# Patient Record
Sex: MALE | Race: WHITE | Employment: OTHER | URBAN - METROPOLITAN AREA
[De-identification: names, ages, dates, MRNs, and addresses within clinical notes are randomized per-mention and may not be internally consistent; named-entity substitution may affect disease eponyms.]

---

## 2019-08-14 ENCOUNTER — TRANSFERRED RECORDS (OUTPATIENT)
Dept: HEALTH INFORMATION MANAGEMENT | Facility: CLINIC | Age: 74
End: 2019-08-14

## 2019-10-10 ENCOUNTER — TRANSFERRED RECORDS (OUTPATIENT)
Dept: HEALTH INFORMATION MANAGEMENT | Facility: CLINIC | Age: 74
End: 2019-10-10

## 2019-11-07 ENCOUNTER — TRANSFERRED RECORDS (OUTPATIENT)
Dept: HEALTH INFORMATION MANAGEMENT | Facility: CLINIC | Age: 74
End: 2019-11-07

## 2019-11-12 ENCOUNTER — TRANSFERRED RECORDS (OUTPATIENT)
Dept: HEALTH INFORMATION MANAGEMENT | Facility: CLINIC | Age: 74
End: 2019-11-12

## 2019-11-15 ENCOUNTER — TRANSFERRED RECORDS (OUTPATIENT)
Dept: HEALTH INFORMATION MANAGEMENT | Facility: CLINIC | Age: 74
End: 2019-11-15

## 2019-11-15 ENCOUNTER — MEDICAL CORRESPONDENCE (OUTPATIENT)
Dept: HEALTH INFORMATION MANAGEMENT | Facility: CLINIC | Age: 74
End: 2019-11-15

## 2019-12-16 ENCOUNTER — TRANSFERRED RECORDS (OUTPATIENT)
Dept: HEALTH INFORMATION MANAGEMENT | Facility: CLINIC | Age: 74
End: 2019-12-16

## 2019-12-18 ENCOUNTER — TRANSCRIBE ORDERS (OUTPATIENT)
Dept: OTHER | Age: 74
End: 2019-12-18

## 2019-12-18 DIAGNOSIS — K22.719 BARRETT'S ESOPHAGUS WITH DYSPLASIA: Primary | ICD-10-CM

## 2019-12-19 ENCOUNTER — CARE COORDINATION (OUTPATIENT)
Dept: GASTROENTEROLOGY | Facility: CLINIC | Age: 74
End: 2019-12-19

## 2019-12-19 DIAGNOSIS — C15.9 ESOPHAGEAL CANCER (H): Primary | ICD-10-CM

## 2019-12-19 NOTE — PROGRESS NOTES
Care Coordination New Patient Referral  [facility]    NP referral date: 12/19/19  Referred to MD: [facility]    Referring MD: Leeroy Peña   Referring contact information see initial referral note - referral received 12/17/19 via fax to [facility] for procedure, no MD specific requested.      Referral for EUS for staging    Diagnosis: esophageal cancer - invasive Barretts    Imaging:   CT   [facility]  Date:  10/10/19  MRI    [facility]  Date:  11/20/19  PET 12/16/19    Procedures:  EGD    Referral  Briefly reviewed by Dr. Araujo. Proceed with EUS for staging.  [facility] is aware that [facility] will be tentatively scheduled for 1/7/20 and time is being held in endoscopy for EUS.  Images have been requested.    Referral sent to endoscopy scheduling on 01/02/20 at 9:15 am via in [facility] staff message and the patient will be contacted with appointment.       Rajwinder KAYN, HNBC  RN Care Coordinator  [facility]  Ph: [phone]  Email: [email or web address]

## 2019-12-26 ENCOUNTER — TRANSFERRED RECORDS (OUTPATIENT)
Dept: HEALTH INFORMATION MANAGEMENT | Facility: CLINIC | Age: 74
End: 2019-12-26

## 2019-12-27 ENCOUNTER — TELEPHONE (OUTPATIENT)
Dept: GASTROENTEROLOGY | Facility: CLINIC | Age: 74
End: 2019-12-27

## 2019-12-27 NOTE — TELEPHONE ENCOUNTER
Care Coordination Telephone Call  [facility]     [facility]  Informed that Dr. Araujo will review and we are holding time for procedure 1/7/20.    Rajwinder KAYN, HNBC, STAR-T  RN Care Coordinator  [facility]  Ph: [phone]  FAX: [phone]

## 2019-12-27 NOTE — TELEPHONE ENCOUNTER
[facility] Call Center    Phone Message    May a detailed message be left on voicemail: yes    Reason for Call: Other: Please call Joe from the [facility] at [phone] re appt for Rian.     Action Taken: Other: Soha Mora

## 2020-01-02 ENCOUNTER — TELEPHONE (OUTPATIENT)
Dept: GASTROENTEROLOGY | Facility: CLINIC | Age: 75
End: 2020-01-02

## 2020-01-02 NOTE — TELEPHONE ENCOUNTER
Patient Name: Rian Lozada   : 1945  MRN: [id number]       : [x] N/A       [x] Gary Inactive  _____________________________________________________      Patient scheduled for:    [x] EUS      Indication for procedure.  [x] Esophageal CA; Shepherd's     Sedation Type:   [x] MAC       Procedure Provider:  Van      Referring Provider. Leeroy Peña ([facility])    Arrival time verified: .830    Facility location verified:   [facility] - [address] [address]  1st Floor, Rm [phone]    [x] N/A for this Payor ([place])    Pt meets medical necessity for outpatient procedure in hospital Endoscopy Unit: N/A      Additional Information: See below  __________________________________________________      Patient taking any blood thinners ? Yes    Anticoagulants or blood thinners:           [x] ASA 81mg  - may continue   ................................................            Heart disease ? Yes: HTN      Lung disease ? No        Sleep apnea ? No      Diabetic ? No      Kidney disease ? Ukn: Monitoring a spot on Rt kidney  Hemodialysis: N/A      Electronic implanted medical devices ? No      History & Physical: [x] Complete, Date [facility] Week of 19 - will have H&P faxed to Endoscopy for scanning      Prep Type:   [x]NPO /p MN, No solid food /p 2200 the night before      Instructions given: [x] Verbal   [x] Reviewed         Pre procedure teaching completed: [x] Yes - Reviewed      IV Sedation: [x] No questions regarding Sedation as ordered       :   o Transportation from procedure & responsible adult to be with patient following procedure for a minimum of 24 hrs (MAC): [x] Wife - confirmed will have post-procedure companionship as required  _______________________________________________    Jennifer Ferrell RN  [facility]

## 2020-01-07 ENCOUNTER — HOSPITAL ENCOUNTER (OUTPATIENT)
Facility: CLINIC | Age: 75
Discharge: HOME | End: 2020-01-07
Attending: INTERNAL MEDICINE | Admitting: INTERNAL MEDICINE
Payer: MEDICARE

## 2020-01-07 ENCOUNTER — SURGERY (OUTPATIENT)
Age: 75
End: 2020-01-07
Payer: MEDICARE

## 2020-01-07 ENCOUNTER — TRANSFERRED RECORDS (OUTPATIENT)
Dept: HEALTH INFORMATION MANAGEMENT | Facility: CLINIC | Age: 75
End: 2020-01-07

## 2020-01-07 ENCOUNTER — ANESTHESIA (OUTPATIENT)
Dept: GASTROENTEROLOGY | Facility: CLINIC | Age: 75
End: 2020-01-07
Payer: MEDICARE

## 2020-01-07 ENCOUNTER — ANESTHESIA EVENT (OUTPATIENT)
Dept: GASTROENTEROLOGY | Facility: CLINIC | Age: 75
End: 2020-01-07
Payer: MEDICARE

## 2020-01-07 ENCOUNTER — ANESTHESIA EVENT (OUTPATIENT)
Dept: GASTROENTEROLOGY | Facility: CLINIC | Age: 75
End: 2020-01-07

## 2020-01-07 VITALS
HEIGHT: 69 IN | SYSTOLIC BLOOD PRESSURE: 145 MMHG | OXYGEN SATURATION: 96 % | BODY MASS INDEX: 25.92 KG/M2 | RESPIRATION RATE: 12 BRPM | DIASTOLIC BLOOD PRESSURE: 75 MMHG | WEIGHT: 175 LBS

## 2020-01-07 LAB — UPPER EUS: NORMAL

## 2020-01-07 PROCEDURE — 25800030 ZZH RX IP 258 OP 636: Performed by: NURSE ANESTHETIST, CERTIFIED REGISTERED

## 2020-01-07 PROCEDURE — 25000132 ZZH RX MED GY IP 250 OP 250 PS 637: Mod: GY | Performed by: INTERNAL MEDICINE

## 2020-01-07 PROCEDURE — 25000125 ZZHC RX 250: Performed by: NURSE ANESTHETIST, CERTIFIED REGISTERED

## 2020-01-07 PROCEDURE — 88305 TISSUE EXAM BY PATHOLOGIST: CPT | Performed by: INTERNAL MEDICINE

## 2020-01-07 PROCEDURE — 37000008 ZZH ANESTHESIA TECHNICAL FEE, 1ST 30 MIN: Performed by: INTERNAL MEDICINE

## 2020-01-07 PROCEDURE — 37000009 ZZH ANESTHESIA TECHNICAL FEE, EACH ADDTL 15 MIN: Performed by: INTERNAL MEDICINE

## 2020-01-07 PROCEDURE — 43239 EGD BIOPSY SINGLE/MULTIPLE: CPT | Mod: XS | Performed by: INTERNAL MEDICINE

## 2020-01-07 PROCEDURE — 43259 EGD US EXAM DUODENUM/JEJUNUM: CPT | Performed by: INTERNAL MEDICINE

## 2020-01-07 PROCEDURE — 25000128 H RX IP 250 OP 636: Performed by: NURSE ANESTHETIST, CERTIFIED REGISTERED

## 2020-01-07 RX ORDER — EPHEDRINE SULFATE 50 MG/ML
INJECTION, SOLUTION INTRAMUSCULAR; INTRAVENOUS; SUBCUTANEOUS PRN
Status: DISCONTINUED | OUTPATIENT
Start: 2020-01-07 | End: 2020-01-07

## 2020-01-07 RX ORDER — ASPIRIN 81 MG/1
81 TABLET, CHEWABLE ORAL DAILY
COMMUNITY

## 2020-01-07 RX ORDER — LIDOCAINE 40 MG/G
CREAM TOPICAL
Status: DISCONTINUED | OUTPATIENT
Start: 2020-01-07 | End: 2020-01-07 | Stop reason: HOSPADM

## 2020-01-07 RX ORDER — PROPOFOL 10 MG/ML
INJECTION, EMULSION INTRAVENOUS CONTINUOUS PRN
Status: DISCONTINUED | OUTPATIENT
Start: 2020-01-07 | End: 2020-01-07

## 2020-01-07 RX ORDER — GLYCOPYRROLATE 0.2 MG/ML
INJECTION, SOLUTION INTRAMUSCULAR; INTRAVENOUS PRN
Status: DISCONTINUED | OUTPATIENT
Start: 2020-01-07 | End: 2020-01-07

## 2020-01-07 RX ORDER — TAMSULOSIN HYDROCHLORIDE 0.4 MG/1
0.4 CAPSULE ORAL DAILY
COMMUNITY

## 2020-01-07 RX ORDER — FAMOTIDINE 20 MG
1000 TABLET ORAL DAILY
COMMUNITY

## 2020-01-07 RX ORDER — ATENOLOL 25 MG/1
25 TABLET ORAL DAILY
COMMUNITY

## 2020-01-07 RX ORDER — LIDOCAINE HYDROCHLORIDE 40 MG/ML
SOLUTION TOPICAL PRN
Status: DISCONTINUED | OUTPATIENT
Start: 2020-01-07 | End: 2020-01-07

## 2020-01-07 RX ORDER — AMLODIPINE BESYLATE 10 MG/1
10 TABLET ORAL DAILY
COMMUNITY

## 2020-01-07 RX ORDER — PRAVASTATIN SODIUM 80 MG/1
80 TABLET ORAL DAILY
COMMUNITY

## 2020-01-07 RX ORDER — MULTIVITAMIN,THERAPEUTIC
1 TABLET ORAL DAILY
COMMUNITY

## 2020-01-07 RX ORDER — SIMETHICONE
LIQUID (ML) MISCELLANEOUS PRN
Status: DISCONTINUED | OUTPATIENT
Start: 2020-01-07 | End: 2020-01-07 | Stop reason: HOSPADM

## 2020-01-07 RX ORDER — CITALOPRAM HYDROBROMIDE 40 MG/1
40 TABLET ORAL DAILY
COMMUNITY

## 2020-01-07 RX ORDER — LISINOPRIL 10 MG/1
10 TABLET ORAL 2 TIMES DAILY
COMMUNITY

## 2020-01-07 RX ORDER — PROPOFOL 10 MG/ML
INJECTION, EMULSION INTRAVENOUS PRN
Status: DISCONTINUED | OUTPATIENT
Start: 2020-01-07 | End: 2020-01-07

## 2020-01-07 RX ORDER — SODIUM CHLORIDE, SODIUM LACTATE, POTASSIUM CHLORIDE, CALCIUM CHLORIDE 600; 310; 30; 20 MG/100ML; MG/100ML; MG/100ML; MG/100ML
INJECTION, SOLUTION INTRAVENOUS CONTINUOUS PRN
Status: DISCONTINUED | OUTPATIENT
Start: 2020-01-07 | End: 2020-01-07

## 2020-01-07 RX ADMIN — GLYCOPYRROLATE 0.2 MG: 0.2 INJECTION, SOLUTION INTRAMUSCULAR; INTRAVENOUS at 10:30

## 2020-01-07 RX ADMIN — PROPOFOL 20 MG: 10 INJECTION, EMULSION INTRAVENOUS at 10:18

## 2020-01-07 RX ADMIN — SODIUM CHLORIDE, POTASSIUM CHLORIDE, SODIUM LACTATE AND CALCIUM CHLORIDE: 600; 310; 30; 20 INJECTION, SOLUTION INTRAVENOUS at 10:18

## 2020-01-07 RX ADMIN — PROPOFOL 20 MG: 10 INJECTION, EMULSION INTRAVENOUS at 10:34

## 2020-01-07 RX ADMIN — PROPOFOL 150 MCG/KG/MIN: 10 INJECTION, EMULSION INTRAVENOUS at 10:18

## 2020-01-07 RX ADMIN — PROPOFOL 20 MG: 10 INJECTION, EMULSION INTRAVENOUS at 10:23

## 2020-01-07 RX ADMIN — Medication 2 ML: at 10:07

## 2020-01-07 RX ADMIN — Medication 5 MG: at 11:23

## 2020-01-07 RX ADMIN — LIDOCAINE HYDROCHLORIDE 5 ML: 40 SOLUTION TOPICAL at 10:23

## 2020-01-07 ASSESSMENT — MIFFLIN-ST. JEOR: SCORE: 1534.17

## 2020-01-07 NOTE — ANESTHESIA POSTPROCEDURE EVALUATION
Anesthesia POST Procedure Evaluation    Patient: Rian Lozada   MRN:     [id number] Gender:   male   Age:    74 year old :      1945        Preoperative Diagnosis: Esophageal cancer (H) [C15.9]   Procedure(s):  ESOPHAGOGASTRODUODENOSCOPY, WITH ENDOSCOPIC US  Esophagogastroduodenoscopy, With Biopsy   Postop Comments: No value filed.       Anesthesia Type:  No value filed.  MAC    Reportable Event: NO     PAIN: Uncomplicated   Sign Out status: Comfortable, Well controlled pain     PONV: No PONV   Sign Out status:  No Nausea or Vomiting     Neuro/Psych: Uneventful perioperative course   Sign Out Status: Preoperative baseline; Age appropriate mentation     Airway/Resp.: Uneventful perioperative course   Sign Out Status: Non labored breathing, age appropriate RR; Resp. Status within EXPECTED Parameters     CV: Uneventful perioperative course   Sign Out status: Appropriate BP and perfusion indices; Appropriate HR/Rhythm     Disposition:   Sign Out in:  PACU  Disposition:  Phase II; Home  Recovery Course: Uneventful  Follow-Up: Not required           Last Anesthesia Record Vitals:  CRNA VITALS  2020 1059 - 2020 1133      2020             NIBP:  (!) 86/47    NIBP Mean:  62          Last PACU Vitals:  Vitals Value Taken Time   /53 2020 11:30 AM   Temp     Pulse 58 2020 11:30 AM   Resp 12 2020 11:29 AM   SpO2 95 % 2020 11:29 AM   Temp src     NIBP 86/47 2020 11:21 AM   Pulse 56 2020 11:19 AM   SpO2 95 % 2020 11:19 AM   Resp     Temp     Ht Rate 56 2020 11:19 AM   Temp 2     Vitals shown include unvalidated device data.      Electronically Signed By: Jorge Flores MD, 2020, 11:33 AM

## 2020-01-07 NOTE — DISCHARGE INSTRUCTIONS
[facility] Endoscopic Ultrasound with Monitored Anesthesia Care  For 24 hours after your procedure  Sedation:  1. Get plenty of rest. A responsible adult must stay with you for at least 24 hours after you leave the hospital.   2. Do not drive or use heavy equipment. If you have weakness or tingling, don't drive or use heavy equipment until this feeling goes away.  3. Do not drink alcohol.  4. Avoid strenuous or risky activities. Ask for help when climbing stairs.   5. You may feel lightheaded. IF so, sit for a few minutes before standing. Have someone help you get up.   6. If you have nausea (feel sick to your stomach): Drink only clear liquids such as apple juice, ginger ale, broth or 7-Up. Rest may also help. Be sure to drink enough fluids. Move to a regular diet as you feel able.  7. You may have a slight fever. Call the doctor if your fever is over 100 F (37.7 C) (taken under the tongue) or lasts longer than 24 hours.  8. You may have a dry mouth, a sore throat, muscle aches or trouble sleeping. These should go away after 24 hours.  9. Do not make important or legal decisions.   Procedural:  1. Wait one hour before eating or drinking. Start with sips of water. When your gag reflex has returned, you may return to your normal diet, medicines, and light exercise.  2. Some bloating is normal. You may have large burps or pass air.  3. You may have a sore throat for 2 to 3 days. If so, it may help to:    Avoid hot liquids for 24 hours.    Use sore throat lozenges.    Gargle as need with salt water up to 4 times a day. Mix 1 cup of warm water with 1 teaspoon of salt. Do not swallow.  4. You may take Tylenol (acetaminophen) for pain unless your doctor has told you not to.     Call right away if you have:  1. Unusual pain in belly or chest pain not relieved with passing air.  2. Severe throat pain or trouble swallowing.  3. Black stools (tar-like looking bowel movement).  4. Signs of infection (fever, growing tenderness at the  surgery site, a large amount of drainage or bleeding, severe pain, foul-smelling drainage, redness, swelling).  5. It has been over 8 to 10 hours since surgery and you are still not able to urinate (pass water).  6. Headache for over 24 hours.    Follow-up:  We took small tissue or fluid samples. Your doctor will call you with the results within two weeks.  If you have severe pain, bleeding, vomit blood, or shortness of breath, go to an emergency room.  If you have questions, call:  Endoscopy: Monday to Friday, 7 a.m. to 4:30 p.m. [phone] (We may have to call you back)  After hours: Hospital  [phone] (Ask for the GI fellow on call)

## 2020-01-07 NOTE — ANESTHESIA PREPROCEDURE EVALUATION
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     Cancer (H)     skin     Hypertension       Past Surgical History:   Procedure Laterality Date     APPENDECTOMY       ORTHOPEDIC SURGERY      bilat rotator cuff               [facility] AN PHYSICAL EXAM    LABS:  CBC: No results found for: WBC, HGB, HCT, PLT  BMP: No results found for: NA, POTASSIUM, CHLORIDE, CO2, BUN, CR, GLC  COAGS: No results found for: PTT, INR, FIBR  POC: No results found for: BGM, HCG, HCGS  OTHER: No results found for: PH, LACT, A1C, ALEJANDRO, PHOS, MAG, ALBUMIN, PROTTOTAL, ALT, AST, GGT, ALKPHOS, BILITOTAL, BILIDIRECT, LIPASE, AMYLASE, HAKEEM, TSH, T4, T3, CRP, SED     Preop Vitals    BP Readings from Last 3 Encounters:   20 (!) 145/75    Pulse Readings from Last 3 Encounters:   No data found for Pulse      Resp Readings from Last 3 Encounters:   20 12    SpO2 Readings from Last 3 Encounters:   20 96%      Temp Readings from Last 1 Encounters:   No data found for Temp    Ht Readings from Last 1 Encounters:   20 1.753 m (5' 9\")      Wt Readings from Last 1 Encounters:   20 79.4 kg (175 lb)    Estimated body mass index is 25.84 kg/m  as calculated from the following:    Height as of 20: 1.753 m (5' 9\").    Weight as of 20: 79.4 kg (175 lb).     LDA:  Peripheral IV 20 Right Upper forearm (Active)   Number of days: 0        Assessment:   ASA SCORE: 3    H&P: History and physical reviewed and following examination; no interval change.         Plan:   Anes. Type:  MAC   Pre-Medication: None   Induction:  N/a   Airway: Native Airway   Access/Monitoring: PIV   Maintenance: N/a     Postop Plan:   Postop Pain: None  Postop Sedation/Airway: Not planned     CONSENT: " Direct conversation   Plan and risks discussed with: Patient   Blood Products: Consent Deferred (Minimal Blood Loss)               Chart reviewed and patient examined. Plan discussed with patient.   MD Jorge Patterson MD

## 2020-01-07 NOTE — ANESTHESIA CARE TRANSFER NOTE
Patient: Rian Lozada    Procedure(s):  ESOPHAGOGASTRODUODENOSCOPY, WITH ENDOSCOPIC US  Esophagogastroduodenoscopy, With Biopsy    Diagnosis: Esophageal cancer (H) [C15.9]  Diagnosis Additional Information: No value filed.    Anesthesia Type:   No value filed.     Note:  Airway :Room Air  Patient transferred to:Phase II (ENDO)  Comments: VSS, Report to RN,  Pt awake, interacting with staffHandoff Report: Identifed the Patient, Identified the Reponsible Provider, Reviewed the pertinent medical history, Discussed the surgical course, Reviewed Intra-OP anesthesia mangement and issues during anesthesia, Set expectations for post-procedure period and Allowed opportunity for questions and acknowledgement of understanding      Vitals: (Last set prior to Anesthesia Care Transfer)    CRNA VITALS  1/7/2020 1059 - 1/7/2020 1129      1/7/2020             NIBP:  (!) 86/47    NIBP Mean:  62                Electronically Signed By: GRISELDA Read CRNA  January 7, 2020  11:29 AM

## 2020-01-08 ENCOUNTER — TELEPHONE (OUTPATIENT)
Dept: GASTROENTEROLOGY | Facility: CLINIC | Age: 75
End: 2020-01-08
Payer: MEDICARE

## 2020-01-08 LAB — COPATH REPORT: NORMAL

## 2020-01-08 NOTE — TELEPHONE ENCOUNTER
Biopsies from distal esophagus/GE junction returned showing no evidence of residual carcinoma.    Called and updated ptHunter Purdy - please ensure E[facility] report and biopsy results are [facility].    OPAL Araujo MD  Associate Professor of Medicine  Division of Gastroenterology, Hepatology and Nutrition  [facility]

## 2024-12-18 NOTE — ANESTHESIA PREPROCEDURE EVALUATION
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     Cancer (H)     skin     Hypertension       Past Surgical History:   Procedure Laterality Date     APPENDECTOMY       ORTHOPEDIC SURGERY      bilat rotator cuff               [facility] AN PHYSICAL EXAM    LABS:  CBC: No results found for: WBC, HGB, HCT, PLT  BMP: No results found for: NA, POTASSIUM, CHLORIDE, CO2, BUN, CR, GLC  COAGS: No results found for: PTT, INR, FIBR  POC: No results found for: BGM, HCG, HCGS  OTHER: No results found for: PH, LACT, A1C, ALEJANDRO, PHOS, MAG, ALBUMIN, PROTTOTAL, ALT, AST, GGT, ALKPHOS, BILITOTAL, BILIDIRECT, LIPASE, AMYLASE, HAKEEM, TSH, T4, T3, CRP, SED     Preop Vitals    BP Readings from Last 3 Encounters:   20 (!) 145/75    Pulse Readings from Last 3 Encounters:   No data found for Pulse      Resp Readings from Last 3 Encounters:   20 12    SpO2 Readings from Last 3 Encounters:   20 96%      Temp Readings from Last 1 Encounters:   No data found for Temp    Ht Readings from Last 1 Encounters:   20 1.753 m (5' 9\")      Wt Readings from Last 1 Encounters:   20 79.4 kg (175 lb)    Estimated body mass index is 25.84 kg/m  as calculated from the following:    Height as of this encounter: 1.753 m (5' 9\").    Weight as of this encounter: 79.4 kg (175 lb).     LDA:  Peripheral IV 20 Right Upper forearm (Active)   Site Assessment WDL 2020  9:27 AM   Line Status Saline locked 2020  9:27 AM   Phlebitis Scale 0-->no symptoms 2020  9:27 AM   Infiltration Scale 0 2020  9:27 AM   Extravasation? No 2020  9:27 AM   Dressing Intervention " New dressing  1/7/2020  9:27 AM   Number of days: 0        Assessment:   ASA SCORE: 3    H&P: History and physical reviewed and following examination; no interval change.         Plan:   Anes. Type:  MAC   Pre-Medication: None   Induction:  N/a   Airway: Native Airway   Access/Monitoring: PIV   Maintenance: N/a     Postop Plan:   Postop Pain: None  Postop Sedation/Airway: Not planned  Disposition: Outpatient     CONSENT: Direct conversation   Plan and risks discussed with: Patient   Blood Products: Consent Deferred (Minimal Blood Loss)               Chart reviewed and patient examined. Plan discussed with patient.   MD Jorge Patterson MD   Initiate Treatment: Opzelura apply to affected areas on the face, neck, abdomen QD -BID Plan: Discussed characteristics of vitiligo, reassured patient the diagnosis is benign, discussed treatment options including opzelura which should be breast feeding safe, advised patirnt to begin BID once pigmented is reoccurring she can switch to QD for maintenance, Will re-evaluate in 3 months. Detail Level: Zone

## (undated) RX ORDER — EPHEDRINE SULFATE 50 MG/ML
INJECTION, SOLUTION INTRAMUSCULAR; INTRAVENOUS; SUBCUTANEOUS
Status: DISPENSED
Start: 2020-01-07

## (undated) RX ORDER — PROPOFOL 10 MG/ML
INJECTION, EMULSION INTRAVENOUS
Status: DISPENSED
Start: 2020-01-07

## (undated) RX ORDER — LIDOCAINE HYDROCHLORIDE 20 MG/ML
SOLUTION OROPHARYNGEAL
Status: DISPENSED
Start: 2020-01-07

## (undated) RX ORDER — GLYCOPYRROLATE 0.2 MG/ML
INJECTION, SOLUTION INTRAMUSCULAR; INTRAVENOUS
Status: DISPENSED
Start: 2020-01-07